# Patient Record
Sex: MALE | URBAN - METROPOLITAN AREA
[De-identification: names, ages, dates, MRNs, and addresses within clinical notes are randomized per-mention and may not be internally consistent; named-entity substitution may affect disease eponyms.]

---

## 2023-04-29 ENCOUNTER — EMERGENCY (EMERGENCY)
Facility: HOSPITAL | Age: 33
LOS: 1 days | Discharge: HOME | End: 2023-04-29
Attending: STUDENT IN AN ORGANIZED HEALTH CARE EDUCATION/TRAINING PROGRAM
Payer: COMMERCIAL

## 2023-04-29 VITALS
HEART RATE: 92 BPM | TEMPERATURE: 98 F | OXYGEN SATURATION: 100 % | RESPIRATION RATE: 18 BRPM | DIASTOLIC BLOOD PRESSURE: 80 MMHG | SYSTOLIC BLOOD PRESSURE: 118 MMHG

## 2023-04-29 VITALS
HEART RATE: 98 BPM | HEIGHT: 70 IN | TEMPERATURE: 98 F | RESPIRATION RATE: 17 BRPM | SYSTOLIC BLOOD PRESSURE: 101 MMHG | WEIGHT: 220.02 LBS | DIASTOLIC BLOOD PRESSURE: 75 MMHG | OXYGEN SATURATION: 96 %

## 2023-04-29 PROCEDURE — 99283 EMERGENCY DEPT VISIT LOW MDM: CPT

## 2023-04-29 NOTE — ED PROVIDER NOTE - OBJECTIVE STATEMENT
32 y/o M PMhx hemorrhoids presents with BRBPR earlier today first noticed after applying preparation H to area, followed by dark blood not related to bowel movements. Patient also has rectal pain with movement. Sexually active with wife. Endorses constipation. Denies fevers, urinary symptoms, abdominal pain, N/V.

## 2023-04-29 NOTE — ED ADULT NURSE NOTE - OBJECTIVE STATEMENT
32 yo M pt hx of hemorrhoids p/w 5 days of rectal pain with BRBPR starting a few hrs ago. pt seen in UC earlier and given gauze to stop bleeding. pt denies AC use and has tried Ibuprofen with minimal relief >6 hrs ago. pt denies discharge from rectum other than bright red blood.  pt is A&Ox4, MAEW, abd soft non tender, breathing comfortably, skin warm dry/normal for race.  Pt denies: headache, trauma, risk for STI, dizziness, chest pain, palpitations, cough, SOB, abdominal pain, n/v/d, urinary symptoms, fevers, chills, weakness at this time.

## 2023-04-29 NOTE — ED PROVIDER NOTE - PHYSICAL EXAMINATION
GEN: Patient awake alert NAD.   HEENT: normocephalic, atraumatic, EOMI,  CARDIAC: RRR, S1, S2, no murmur.   PULM: unlabored breathing on room aire   ABD: nondistended  : Rectal chaperoned by Dr. Price. External hemorrhoid with large blood clot  MSK: Moving all extremities, no edema.  full ROM in all extremities.     NEURO: A&Ox3, gait normal, CN 2-12 grossly intact  SKIN: warm, dry, no rash.

## 2023-04-29 NOTE — ED PROVIDER NOTE - ATTENDING CONTRIBUTION TO CARE
I, Genie Price, performed a history and physical exam of the patient and discussed their management with the resident and /or advanced care provider. I reviewed the resident and /or ACP's note and agree with the documented findings and plan of care. I was present and available for all procedures.     33-year-old male with history of hemorrhoids presenting to the ED with complaints of bright red blood per rectum earlier today.  Patient states that he was applying Preparation H for hemorrhoid and noted blood present followed by dark blood unrelated to bowel movement.  Also with rectal pain worse with movement.  Seen as an urgent care and was referred to the ED for possible embolization.  No fevers chills dysuria hematuria abdominal pain nausea or vomiting.      Well-appearing in no acute distress resting comfortably on stretcher.  Regular rate and rhythm, lungs clear to auscultation bilaterally speaking in full sentences without increased work of breathing.  Saturating well on room air.  Abdomen soft nontender no rebound or guarding.  No CVA tenderness.  No lower extremity edema.  With external thrombosed hemorrhoid with visualization of small portion of larger blood clot.  Evacuation of entire blood clot performed with patient feeling improvement of pain.  Supportive care instructions given patient stable for discharge at this time.

## 2023-04-29 NOTE — ED PROVIDER NOTE - CLINICAL SUMMARY MEDICAL DECISION MAKING FREE TEXT BOX
34 y/o M PMhx hemorrhoids presents with BRBPR earlier today first noticed after applying preparation H to are, followed by dark blood not related to bowel movements. Patient also has rectal pain with movement. Afebrile, external hemorrhoid noted with large clot. Likely thrombosed hemorrhoids that has ruptured. Will remove remaining clot, provide colorectal follow-up.

## 2023-04-29 NOTE — ED PROVIDER NOTE - PROGRESS NOTE DETAILS
Large clot removed from hemorrhoid. Patient to be discharged with colorectal surgery follow-up.  -Sam Pettit PGY-1

## 2023-04-29 NOTE — ED PROVIDER NOTE - NSFOLLOWUPCLINICS_GEN_ALL_ED_FT
[facility]  General Surgery  [address] [place] [address] - [address] [place] [address]  Phone: [phone]  Fax:

## 2023-05-04 ENCOUNTER — APPOINTMENT (OUTPATIENT)
Dept: SURGERY | Facility: CLINIC | Age: 33
End: 2023-05-04
Payer: COMMERCIAL

## 2023-05-04 VITALS
WEIGHT: 225 LBS | RESPIRATION RATE: 15 BRPM | SYSTOLIC BLOOD PRESSURE: 134 MMHG | HEIGHT: 70 IN | BODY MASS INDEX: 32.21 KG/M2 | OXYGEN SATURATION: 95 % | TEMPERATURE: 98.6 F | DIASTOLIC BLOOD PRESSURE: 78 MMHG | HEART RATE: 87 BPM

## 2023-05-04 DIAGNOSIS — Z78.9 OTHER SPECIFIED HEALTH STATUS: ICD-10-CM

## 2023-05-04 PROBLEM — Z00.00 ENCOUNTER FOR PREVENTIVE HEALTH EXAMINATION: Status: ACTIVE | Noted: 2023-05-04

## 2023-05-04 PROCEDURE — 46600 DIAGNOSTIC ANOSCOPY SPX: CPT

## 2023-05-04 PROCEDURE — 99203 OFFICE O/P NEW LOW 30 MIN: CPT | Mod: 25

## 2023-05-04 RX ORDER — SEMAGLUTIDE 1.34 MG/ML
2 INJECTION, SOLUTION SUBCUTANEOUS
Refills: 0 | Status: ACTIVE | COMMUNITY

## 2023-05-11 NOTE — PHYSICAL EXAM
[Normal Breath Sounds] : Normal breath sounds [Normal Heart Sounds] : normal heart sounds [Normal Rate and Rhythm] : normal rate and rhythm [No Rash or Lesion] : No rash or lesion [Alert] : alert [Oriented to Person] : oriented to person [Oriented to Place] : oriented to place [Oriented to Time] : oriented to time [Calm] : calm [de-identified] : round, soft, NT/ND, +BS [de-identified] : right sided mildly inflamed external hemorrhoid w/ ulceration noted (likely from I&D), no pain on SAE, internal exam below [de-identified] : healthy male [de-identified] : NC/AT [de-identified] : deferred [de-identified] : MONIQUE/+ROM [de-identified] : intact

## 2023-05-11 NOTE — HISTORY OF PRESENT ILLNESS
[FreeTextEntry1] : 33 year old male w/ a history of H. pylori presents for follow-up from ED visit. Patient has had pain and swelling in the area for about 1 week. He initially went to urgent care where he was given hydrocortisone in addition to the prep H he was using. When it didn't improve he went to the [facility] ED. At the ED he was told he had a thrombosed hemorrhoid and the ED team performed an I&D told him to follow-up with CRS. Currently, patient is still having bleeding from the site and still feels swelling in the area. Patient has intermittent rectal pain. Patient has a daily, not hard BM with a little straining. No history of colonoscopy. Patient denies family history of colon/rectal cancer, IBD. He is not on a fiber supplement or bowel regimen and says he wasn't told anything about sitz baths.

## 2023-05-11 NOTE — PROCEDURE
[FreeTextEntry1] : Anoscopy - performed with small lighted disposable anoscopy, mildly enlarged internal hemorrhoids noted, normal rectal mucosa

## 2023-05-11 NOTE — ASSESSMENT
[FreeTextEntry1] : 33y.o. M w/ symptomatic external hemorrhoid presenting for follow-up after ED visit where they were I&D'ed for possible thrombosis by the ED.